# Patient Record
Sex: MALE | Race: WHITE | NOT HISPANIC OR LATINO | Employment: FULL TIME | URBAN - METROPOLITAN AREA
[De-identification: names, ages, dates, MRNs, and addresses within clinical notes are randomized per-mention and may not be internally consistent; named-entity substitution may affect disease eponyms.]

---
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
## 2022-01-01 ENCOUNTER — APPOINTMENT (OUTPATIENT)
Dept: LAB | Facility: URGENT CARE | Age: 22
End: 2022-01-01

## 2022-01-01 ENCOUNTER — NURSE TRIAGE (OUTPATIENT)
Dept: FAMILY MEDICINE | Facility: CLINIC | Age: 22
End: 2022-01-01

## 2022-01-01 DIAGNOSIS — Z20.822 CONTACT WITH AND (SUSPECTED) EXPOSURE TO COVID-19: ICD-10-CM

## 2022-01-01 PROCEDURE — 99211 OFF/OP EST MAY X REQ PHY/QHP: CPT | Mod: LAB | Performed by: FAMILY MEDICINE

## 2022-01-01 PROCEDURE — U0005 INFEC AGEN DETEC AMPLI PROBE: HCPCS | Performed by: FAMILY MEDICINE

## 2022-01-01 PROCEDURE — 87635 SARS-COV-2 COVID-19 AMP PRB: CPT | Performed by: FAMILY MEDICINE

## 2022-01-01 NOTE — TELEPHONE ENCOUNTER
COVID-19 SCREENING ASSESSMENT     CRITERIA FOR TESTING  Is patient symptomatic: Yes: What symptoms are you experiencing? Cough, Muscle Pain, Headache and Sore Throat     ORDER QUESTIONS  Date of onset: 12/27/2021     OTHER QUESTIONS  Are you a [employer] employee? No  Have you had close contact with a lab confirmed case of COVID-19 in the last 14 days? no        Reason for Disposition  • Patient has COVID-19 symptoms per CDC guidelines.    Protocols used: COVID-19 TRIAGE PROTOCOL [employer] HE[employer]

## 2022-01-02 LAB — SARS-COV-2 RNA RESP QL NAA+PROBE: POSITIVE
